# Patient Record
Sex: FEMALE | Race: OTHER | URBAN - METROPOLITAN AREA
[De-identification: names, ages, dates, MRNs, and addresses within clinical notes are randomized per-mention and may not be internally consistent; named-entity substitution may affect disease eponyms.]

---

## 2017-02-03 ENCOUNTER — EMERGENCY (EMERGENCY)
Facility: HOSPITAL | Age: 42
LOS: 0 days | Discharge: HOME | End: 2017-02-03

## 2017-06-27 DIAGNOSIS — R51 HEADACHE: ICD-10-CM

## 2017-06-27 DIAGNOSIS — Z87.891 PERSONAL HISTORY OF NICOTINE DEPENDENCE: ICD-10-CM

## 2017-06-27 DIAGNOSIS — Z90.49 ACQUIRED ABSENCE OF OTHER SPECIFIED PARTS OF DIGESTIVE TRACT: ICD-10-CM

## 2017-06-27 DIAGNOSIS — Z98.890 OTHER SPECIFIED POSTPROCEDURAL STATES: ICD-10-CM

## 2017-06-27 DIAGNOSIS — R20.0 ANESTHESIA OF SKIN: ICD-10-CM

## 2018-10-09 ENCOUNTER — INPATIENT (INPATIENT)
Facility: HOSPITAL | Age: 43
LOS: 1 days | Discharge: HOME | End: 2018-10-11
Attending: HOSPITALIST | Admitting: HOSPITALIST
Payer: MEDICAID

## 2018-10-09 VITALS
HEART RATE: 88 BPM | TEMPERATURE: 97 F | RESPIRATION RATE: 18 BRPM | SYSTOLIC BLOOD PRESSURE: 140 MMHG | DIASTOLIC BLOOD PRESSURE: 76 MMHG | OXYGEN SATURATION: 99 %

## 2018-10-09 DIAGNOSIS — Z90.49 ACQUIRED ABSENCE OF OTHER SPECIFIED PARTS OF DIGESTIVE TRACT: Chronic | ICD-10-CM

## 2018-10-09 DIAGNOSIS — Z98.891 HISTORY OF UTERINE SCAR FROM PREVIOUS SURGERY: Chronic | ICD-10-CM

## 2018-10-09 LAB
ALBUMIN SERPL ELPH-MCNC: 4.5 G/DL — SIGNIFICANT CHANGE UP (ref 3.5–5.2)
ALP SERPL-CCNC: 107 U/L — SIGNIFICANT CHANGE UP (ref 30–115)
ALT FLD-CCNC: 14 U/L — SIGNIFICANT CHANGE UP (ref 0–41)
ANION GAP SERPL CALC-SCNC: 15 MMOL/L — HIGH (ref 7–14)
APTT BLD: 36.4 SEC — SIGNIFICANT CHANGE UP (ref 27–39.2)
AST SERPL-CCNC: 27 U/L — SIGNIFICANT CHANGE UP (ref 0–41)
BASOPHILS # BLD AUTO: 0.03 K/UL — SIGNIFICANT CHANGE UP (ref 0–0.2)
BASOPHILS NFR BLD AUTO: 0.3 % — SIGNIFICANT CHANGE UP (ref 0–1)
BILIRUB SERPL-MCNC: 0.3 MG/DL — SIGNIFICANT CHANGE UP (ref 0.2–1.2)
BUN SERPL-MCNC: 9 MG/DL — LOW (ref 10–20)
CALCIUM SERPL-MCNC: 9.2 MG/DL — SIGNIFICANT CHANGE UP (ref 8.5–10.1)
CHLORIDE SERPL-SCNC: 102 MMOL/L — SIGNIFICANT CHANGE UP (ref 98–110)
CK MB CFR SERPL CALC: 1 NG/ML — SIGNIFICANT CHANGE UP (ref 0.6–6.3)
CK SERPL-CCNC: 123 U/L — SIGNIFICANT CHANGE UP (ref 0–225)
CO2 SERPL-SCNC: 21 MMOL/L — SIGNIFICANT CHANGE UP (ref 17–32)
CREAT SERPL-MCNC: 0.6 MG/DL — LOW (ref 0.7–1.5)
EOSINOPHIL # BLD AUTO: 0.1 K/UL — SIGNIFICANT CHANGE UP (ref 0–0.7)
EOSINOPHIL NFR BLD AUTO: 1 % — SIGNIFICANT CHANGE UP (ref 0–8)
GLUCOSE SERPL-MCNC: 103 MG/DL — HIGH (ref 70–99)
HCT VFR BLD CALC: 36 % — LOW (ref 37–47)
HGB BLD-MCNC: 11.8 G/DL — LOW (ref 12–16)
IMM GRANULOCYTES NFR BLD AUTO: 0.3 % — SIGNIFICANT CHANGE UP (ref 0.1–0.3)
INR BLD: 1.14 RATIO — SIGNIFICANT CHANGE UP (ref 0.65–1.3)
LYMPHOCYTES # BLD AUTO: 4.19 K/UL — HIGH (ref 1.2–3.4)
LYMPHOCYTES # BLD AUTO: 43.7 % — SIGNIFICANT CHANGE UP (ref 20.5–51.1)
MAGNESIUM SERPL-MCNC: 2 MG/DL — SIGNIFICANT CHANGE UP (ref 1.8–2.4)
MCHC RBC-ENTMCNC: 23.9 PG — LOW (ref 27–31)
MCHC RBC-ENTMCNC: 32.8 G/DL — SIGNIFICANT CHANGE UP (ref 32–37)
MCV RBC AUTO: 72.9 FL — LOW (ref 81–99)
MONOCYTES # BLD AUTO: 0.73 K/UL — HIGH (ref 0.1–0.6)
MONOCYTES NFR BLD AUTO: 7.6 % — SIGNIFICANT CHANGE UP (ref 1.7–9.3)
NEUTROPHILS # BLD AUTO: 4.5 K/UL — SIGNIFICANT CHANGE UP (ref 1.4–6.5)
NEUTROPHILS NFR BLD AUTO: 47.1 % — SIGNIFICANT CHANGE UP (ref 42.2–75.2)
PLATELET # BLD AUTO: 330 K/UL — SIGNIFICANT CHANGE UP (ref 130–400)
POTASSIUM SERPL-MCNC: 4.4 MMOL/L — SIGNIFICANT CHANGE UP (ref 3.5–5)
POTASSIUM SERPL-SCNC: 4.4 MMOL/L — SIGNIFICANT CHANGE UP (ref 3.5–5)
PROT SERPL-MCNC: 7.3 G/DL — SIGNIFICANT CHANGE UP (ref 6–8)
PROTHROM AB SERPL-ACNC: 12.3 SEC — SIGNIFICANT CHANGE UP (ref 9.95–12.87)
RBC # BLD: 4.94 M/UL — SIGNIFICANT CHANGE UP (ref 4.2–5.4)
RBC # FLD: 15.9 % — HIGH (ref 11.5–14.5)
SODIUM SERPL-SCNC: 138 MMOL/L — SIGNIFICANT CHANGE UP (ref 135–146)
TROPONIN T SERPL-MCNC: <0.01 NG/ML — SIGNIFICANT CHANGE UP
TYPE + AB SCN PNL BLD: SIGNIFICANT CHANGE UP
WBC # BLD: 9.58 K/UL — SIGNIFICANT CHANGE UP (ref 4.8–10.8)
WBC # FLD AUTO: 9.58 K/UL — SIGNIFICANT CHANGE UP (ref 4.8–10.8)

## 2018-10-09 RX ORDER — ASPIRIN/CALCIUM CARB/MAGNESIUM 324 MG
325 TABLET ORAL ONCE
Qty: 0 | Refills: 0 | Status: COMPLETED | OUTPATIENT
Start: 2018-10-09 | End: 2018-10-09

## 2018-10-09 RX ORDER — MIDAZOLAM HYDROCHLORIDE 1 MG/ML
2 INJECTION, SOLUTION INTRAMUSCULAR; INTRAVENOUS ONCE
Qty: 0 | Refills: 0 | Status: DISCONTINUED | OUTPATIENT
Start: 2018-10-09 | End: 2018-10-09

## 2018-10-09 RX ORDER — ENOXAPARIN SODIUM 100 MG/ML
40 INJECTION SUBCUTANEOUS DAILY
Qty: 0 | Refills: 0 | Status: DISCONTINUED | OUTPATIENT
Start: 2018-10-09 | End: 2018-10-11

## 2018-10-09 RX ORDER — ATORVASTATIN CALCIUM 80 MG/1
80 TABLET, FILM COATED ORAL AT BEDTIME
Qty: 0 | Refills: 0 | Status: DISCONTINUED | OUTPATIENT
Start: 2018-10-09 | End: 2018-10-11

## 2018-10-09 RX ADMIN — MIDAZOLAM HYDROCHLORIDE 2 MILLIGRAM(S): 1 INJECTION, SOLUTION INTRAMUSCULAR; INTRAVENOUS at 13:45

## 2018-10-09 RX ADMIN — Medication 325 MILLIGRAM(S): at 14:51

## 2018-10-09 RX ADMIN — ATORVASTATIN CALCIUM 80 MILLIGRAM(S): 80 TABLET, FILM COATED ORAL at 21:36

## 2018-10-09 NOTE — PATIENT PROFILE ADULT - NSPROMUTANXFEARFT_GEN_A_NUR
Left message on unidentified voice mail asking patient to return call to clinic.  Plan pending patient discussion: Maintain TWD  16 mg - 3 mg on Tu, Th; 2 mg all other days.    Patient was instructed to continue taking their Coumadin as follows:    Anticoagulation Summary  As of 5/24/2018    INR goal:   2.0-3.0   TTR:   62.1 % (2.3 y)   Today's INR:   2.4   Warfarin maintenance plan:   3 mg (2 mg x 1.5) on Tu, Th; 2 mg (2 mg x 1) all other days   Weekly warfarin total:   16 mg   No change documented:   Rebeca Gonzales RN   Plan last modified:   Rebeca Gonzales RN (4/6/2018)   Next INR check:   6/21/2018   Priority:   Follow-Up - 4 Weeks   Target end date:   Indefinite    Indications    Persistent atrial fibrillation (CMS/HCC) [I48.1]  Left ventricular apical thrombus [[id number]]             Anticoagulation Episode Summary     INR check location:       Preferred lab:       Send INR reminders to:   [facility]    Comments:   Please call on her cell or work cell [phone] work [phone] [facility]  [phone] ansd phone number # [phone] [facility] med pharmacy [phone].      Anticoagulation Care Providers     Provider Role Specialty Phone number    Neo Hewitt MD Referring Cardiovascular Disease [phone]         Patient was instructed to contact us with any unusual bleeding,bruising,any changes in medication, antibiotic use,diet or health status.  Patient was instructed to call with any questions or concerns.      nervous about having these symptoms

## 2018-10-09 NOTE — H&P ADULT - ATTENDING COMMENTS
Patient is seen and examined independently at bedside. I agree with the resident's note, history, physical exam and plan as above.    All labs, radiologic studies, vitals, orders and medications list reviewed.

## 2018-10-09 NOTE — H&P ADULT - REASON FOR ADMISSION
Left sided facial droop, weakness and hemisensory deficit Left sided facial, upper and lower extremity numbness and hemisensory deficit

## 2018-10-09 NOTE — ED PROVIDER NOTE - OBJECTIVE STATEMENT
43 year old female with a pmh of paniac attacks presents here c/o left sided weakness that began 43 year old female with a pmh of paniac attacks presents here c/o left sided weakness that began this morning at 7am when patient woke up. Patient also c/o headache & left sided facial twitching. Patient has never had these symptoms in the past and denies fever chills blurry vision, vomitting cp sob abdominal pain, urinary frequency/urgency/burning. LMP 2 weeks ago.

## 2018-10-09 NOTE — ED PROVIDER NOTE - ATTENDING CONTRIBUTION TO CARE
43 year old female PMH panic attacks presents here c/o left sided weakness that began 2 hours ago.  Patient also has left face twitching.  This never happened before.  Stroke called called in triage.      Appropriate labs sent, patient had CT scan of brain followed by CTA/Perfusion.  Patient has persistent twitching of face.  Will give trial dose of a benzo (Versed) for possible partial seizure.  Patient evaluated by Neurology.  She is not a t-PA candidate.     CTA performed to evaluate for an LVO.  Baseline MRS score 0. 43 year old female PMH panic attacks presents here c/o left sided weakness that began 2 hours ago.  Patient also has left face twitching.  This never happened before.  Stroke called called in triage.      Appropriate labs sent, patient had CT scan of brain followed by CTA/Perfusion.  Patient has persistent twitching of face.  Will give trial dose of a benzo (Versed) for possible partial seizure.  Patient evaluated by Neurology.  She is not a t-PA candidate.     CTA performed to evaluate for an LVO.  no LVO found. Baseline MRS score 0.  ED work up reviewed.  Will admit to Stroke Unit.

## 2018-10-09 NOTE — H&P ADULT - NSHPSOCIALHISTORY_GEN_ALL_CORE
None smoker  No alcohol use  No illicit drugs    Lives with daughter  Active in ADLs  Works at a [employer] in [place]

## 2018-10-09 NOTE — ED PROVIDER NOTE - CRITICAL CARE PROVIDED
consult w/ pt's family directly relating to pts condition/interpretation of diagnostic studies/consultation with other physicians/additional history taking/direct patient care (not related to procedure)/documentation

## 2018-10-09 NOTE — ED PROVIDER NOTE - MEDICAL DECISION MAKING DETAILS
She was found to have a left upper and lower extremity drift as well as left upper and lower extremity weakness. NIHSS on arrival 3. CTH was negative for acute intracranial pathology. CTA head and neck CTP was negative for LVO. She was outside the therapeutic window for IV TPA. No acute stroke intervention. Left facial twitching may be related to partial seizure and IV Versed given and this completely stopped.  Will admit for EEG and stroke work up.

## 2018-10-09 NOTE — PROGRESS NOTE ADULT - SUBJECTIVE AND OBJECTIVE BOX
AYUSH JACKIE    Chief Complaint: Left sided weakness and numbness.    Right Handed    HPI:  43 year old right handed woman with a past medical history of panic attacks. She woke up with left upper and lower extremity numbness. Upon arrival to ED stroke code called. She was found to have a left upper and lower extremity drift as well as left upper and lower extremity weakness. NIHSS on arrival 3. CTH was negative for acute intracranial pathology. CTA heada nd neck CTP was negative for LVO. She was outside the therapeutic window for IV TPA. No acute stroke intervention. She has inconsistent left facial twitching, drift of left upper and lower extremity is also inconsistent and stuttering.      Relevant PMH:  [] Prior ischemic stroke/TIA  [] Afib  []CAD  []HTN  []DLD  []DM []PVD []Obesity [] Sedintary lifestyle []CHF  []JULY[]Cancer Hx     Social History: [] Smoking []  Drug Use: []   Alcohol Use:   [] Other:      Possible Location of Stroke:  Unknown we will have a better understanding post stroke workup.  Possible Cause of Stroke:  Unknown we will have a better understanding post stroke workup.  Relevant Cerebral Imaging:  CTH negative for acute intracranial pathology.  Relevant Cervicocerebral Imaging:   CTA head and neck CTP negative for LVO    CT Perfusion w/ Maps w/ IV Cont:   EXAM:  CT PERFUSION W MAPS IC        IMPRESSION:    No evidence of major vascular stenosis or occlusion. Normal perfusion   images.    Relevant blood tests:  LDL A1C pending    Relevant cardiac rhythm monitoring:  Telemetry pending  Relevant Cardiac Structure:(TTE/RAFAEL +/-):[]No intracardiac thrombus/[] no vegetation/[]no akynesia/EF:       TTE pending      Home Medications:      MEDICATIONS  (STANDING):      PT/OT/Speech/Rehab/S&Swr: Pending    Exam:    Vital Signs Last 24 Hrs  T(C): 36.1 (09 Oct 2018 12:01), Max: 36.1 (09 Oct 2018 12:01)  T(F): 97 (09 Oct 2018 12:01), Max: 97 (09 Oct 2018 12:01)  HR: 88 (09 Oct 2018 12:01) (88 - 88)  BP: 140/76 (09 Oct 2018 12:01) (140/76 - 140/76)  BP(mean): --  RR: 18 (09 Oct 2018 12:01) (18 - 18)  SpO2: 99% (09 Oct 2018 12:01) (99% - 99%)    NIHSS      LOC:       1a:  0   1b(Questions):    0       1c(Instructions):    0         Best Gaze:0  Visual:0  Motor:                 RUE:  0   RLE:  0   LUE:  1   LLE: 1    FACE:   0  Limb Ataxia:0  Sensory:     1  Language:   0    Dysarthria:   0       Extinction and Inattention:0    NIHSS on admission:    3       m-RS:1    Impression:  43 year old right handed woman with a past medical history of panic attacks. She woke up with left upper and lower extremity numbness. Upon arrival to ED stroke code called. She was found to have a left upper and lower extremity drift as well as left upper and lower extremity weakness. NIHSS on arrival 3. CTH was negative for acute intracranial pathology. CTA head and neck CTP was negative for LVO. She was outside the therapeutic window for IV TPA. No acute stroke intervention. She has inconsistent left facial twitching.      Suggestion:  Routine stroke workup including:  MRI brain without garrick.  TTE with contrast to rule out right to left shunt, akinesia, vegetation, intracardiac thrombus.  Telemetry monitoring.  LDL/A1C  PT OT Rehab     mg PO daily.  Would start statin.  Stat EEG.  Seizure precautions.  Keep magnesium >2.  Disposition:  Stroke Unit.    Luis Reveles NP  [phone] AYUSH JACKIE    Chief Complaint: Left sided weakness and numbness.    Right Handed    HPI:  43 year old right handed woman with a past medical history of panic attacks. She woke up with left upper and lower extremity numbness. Upon arrival to ED stroke code called. She was found to have a left upper and lower extremity drift as well as left upper and lower extremity weakness. NIHSS on arrival 3. CTH was negative for acute intracranial pathology. CTA heada nd neck CTP was negative for LVO. She was outside the therapeutic window for IV TPA. No acute stroke intervention. She has inconsistent left facial twitching, drift of left upper and lower extremity is also inconsistent and stuttering.      Relevant PMH:  [] Prior ischemic stroke/TIA  [] Afib  []CAD  []HTN  []DLD  []DM []PVD []Obesity [] Sedintary lifestyle []CHF  []JULY[]Cancer Hx     Social History: [] Smoking []  Drug Use: []   Alcohol Use:   [] Other:      Possible Location of Stroke:  Unknown we will have a better understanding post stroke workup.  Possible Cause of Stroke:  Unknown we will have a better understanding post stroke workup.  Relevant Cerebral Imaging:  CTH negative for acute intracranial pathology.  Relevant Cervicocerebral Imaging:   CTA head and neck CTP negative for LVO    CT Perfusion w/ Maps w/ IV Cont:   EXAM:  CT PERFUSION W MAPS IC        IMPRESSION:    No evidence of major vascular stenosis or occlusion. Normal perfusion   images.    Relevant blood tests:  LDL A1C pending    Relevant cardiac rhythm monitoring:  Telemetry pending  Relevant Cardiac Structure:(TTE/RAFAEL +/-):[]No intracardiac thrombus/[] no vegetation/[]no akynesia/EF:       TTE pending      Home Medications:      MEDICATIONS  (STANDING):      PT/OT/Speech/Rehab/S&Swr: Pending    Exam:    Vital Signs Last 24 Hrs  T(C): 36.1 (09 Oct 2018 12:01), Max: 36.1 (09 Oct 2018 12:01)  T(F): 97 (09 Oct 2018 12:01), Max: 97 (09 Oct 2018 12:01)  HR: 88 (09 Oct 2018 12:01) (88 - 88)  BP: 140/76 (09 Oct 2018 12:01) (140/76 - 140/76)  BP(mean): --  RR: 18 (09 Oct 2018 12:01) (18 - 18)  SpO2: 99% (09 Oct 2018 12:01) (99% - 99%)    NIHSS      LOC:       1a:  0   1b(Questions):    0       1c(Instructions):    0         Best Gaze:0  Visual:0  Motor:                 RUE:  0   RLE:  0   LUE:  1   LLE: 1    FACE:   0  Limb Ataxia:0  Sensory:     1  Language:   0    Dysarthria:   0       Extinction and Inattention:0    NIHSS on admission:    3       m-RS:1    Impression:  43 year old right handed woman with a past medical history of panic attacks. She woke up with left upper and lower extremity numbness. Upon arrival to ED stroke code called. She was found to have a left upper and lower extremity drift as well as left upper and lower extremity weakness. NIHSS on arrival 3. CTH was negative for acute intracranial pathology. CTA head and neck CTP was negative for LVO. She was outside the therapeutic window for IV TPA. No acute stroke intervention. She has inconsistent left facial twitching.      Suggestion:  Routine stroke workup including:  MRI brain without garrick.  TTE with contrast to rule out right to left shunt, akinesia, vegetation, intracardiac thrombus.  Telemetry monitoring.  LDL/A1C  PT OT Rehab     mg PO daily.  Would start statin.  Stat EEG.  Seizure precautions.  Keep magnesium >2.  Neurochecks Q4 hours and vitals q4 hours  Disposition:  Stroke Unit.    Luis Reveles NP  [phone]

## 2018-10-09 NOTE — H&P ADULT - FAMILY HISTORY
Father  Still living? Yes, Estimated age: 77  Family history of stroke, Age at diagnosis: Age Unknown  Family history of MI (myocardial infarction), Age at diagnosis: Age Unknown  Family history of hypertension, Age at diagnosis: Age Unknown     Mother  Still living? Yes, Estimated age: 75  Family history of osteoporosis, Age at diagnosis: Age Unknown  Family history of osteoarthritis, Age at diagnosis: Age Unknown     Sibling  Still living? Yes, Estimated age: 50  Family history of hypertension, Age at diagnosis: Age Unknown

## 2018-10-09 NOTE — ED PROVIDER NOTE - NS ED ROS FT
Constitutional: See HPI.  Eyes: No visual changes  ENMT: No neck pain or stiffness.   Cardiac: No cp  Respiratory: No cough or respiratory distress.   GI: +nausea no vomiting, diarrhea or abdominal pain.  : No dysuria, frequency or burning.   MS: No myalgia, muscle weakness, joint pain or back pain.  Neuro: see hpi  Skin: No skin rash.

## 2018-10-09 NOTE — ED ADULT NURSE NOTE - NSIMPLEMENTINTERV_GEN_ALL_ED
Implemented All Universal Safety Interventions:  [place] to call system. Call bell, personal items and telephone within reach. Instruct patient to call for assistance. Room bathroom lighting operational. Non-slip footwear when patient is off stretcher. Physically safe environment: no spills, clutter or unnecessary equipment. Stretcher in lowest position, wheels locked, appropriate side rails in place.

## 2018-10-09 NOTE — H&P ADULT - NSHPRISKHIVSCREEN_GEN_ALL_CORE
DISCHARGE PLANNING     Discharge to home or other facility with appropriate resources Progressing        INFECTION - ADULT     Absence or prevention of progression during hospitalization Progressing        Knowledge Deficit     Patient/family/caregiver demonstrates understanding of disease process, treatment plan, medications, and discharge instructions Progressing        PAIN - ADULT     Verbalizes/displays adequate comfort level or baseline comfort level Progressing        POSTPARTUM     Experiences normal postpartum course Progressing     Appropriate maternal -  bonding Progressing     Establishment of infant feeding pattern Progressing     Incision(s), wounds(s) or drain site(s) healing without S/S of infection Progressing        SAFETY ADULT     Patient will remain free of falls Progressing     Maintain or return to baseline ADL function Progressing     Maintain or return mobility status to optimal level Progressing Offered and patient declined

## 2018-10-09 NOTE — H&P ADULT - ASSESSMENT
43 year old right handed female with a past medical history of Panic Attacks and Miscarriage at the age of 17 presenting with left sided facial, upper and lower extremity numbness after waking up.    Left sided numbness and weakness associated with left temporal headache, left blurry vision and left mastoid tenderness/jaw claudication; Right sided cerebral ischemia vs Complicated Migraine vs Giant Cell Arteritis  - s/p CTP with no evidence of focal perfusion deficit to suggest acute cerebral ischemia or large territory infarct.  - In the setting of temporal headache and left blurry vision with a history of miscarriage, will send ESR, CRP and all hypercoagulable panel. Pending Neurointerventional's decision if steroids need to be initiated.  - ASA 162mg, Atorvastatin 80mg  - Lipid profile, HbA1c, TSH, Mg, ESR, CRP, Hypercoagulable workup pending.  - TTE with contrast pending. MRI without garrick pending. EEG pending.  - Stroke unit/Telemetry  - Seizure precautions, Fall precautions, Aspiration precautions  - Physical therapy, Occupational therapy, Physical Medicine rehab assessment, Speech and swallow evaluation    Microcytic Anemia  - Asymptomatic  - Iron studies  - Monitor CBC    DVT prophylaxis; Lovenox  NPO for now until Speech and swallow assessment  Activity; ambulate only with assistance 43 year old right handed female with a past medical history of Panic Attacks and Miscarriage at the age of 17 presenting with left sided facial, upper and lower extremity numbness after waking up.    Left sided numbness and weakness associated with left temporal headache, left blurry vision and left mastoid tenderness/jaw claudication; Right sided cerebral ischemia vs Complicated Migraine vs Giant Cell Arteritis  - s/p CTP with no evidence of focal perfusion deficit to suggest acute cerebral ischemia or large territory infarct.  - In the setting of temporal headache and left blurry vision with a history of miscarriage, will send ESR, CRP and all hypercoagulable panel. Pending Neurointerventional's decision if steroids need to be initiated.  - ASA 162mg, Atorvastatin 80mg  - Lipid profile, HbA1c, TSH, Mg, ESR, CRP, Hypercoagulable workup pending.  - TTE with bubble study pending. MRI without garrick pending. EEG pending.  - Stroke unit/Telemetry  - Seizure precautions, Fall precautions, Aspiration precautions  - Physical therapy, Occupational therapy, Physical Medicine rehab assessment, Speech and swallow evaluation    Microcytic Anemia  - Asymptomatic  - Iron studies  - Monitor CBC    DVT prophylaxis; Lovenox  NPO for now until Speech and swallow assessment  Activity; ambulate only with assistance

## 2018-10-09 NOTE — ED PROVIDER NOTE - PHYSICAL EXAMINATION
CONSTITUTIONAL: WA / WN / NAD  HEAD: NCAT + left sided facial droop & twitching  EYES: PERRL; EOMI;   ENT: Normal pharynx; mucous membranes pink/moist, no erythema.  NECK: Supple;   CARD: RRR; nl S1/S2; no M/R/G. Pulses equal bilaterally.  RESP: Respiratory rate and effort are normal; breath sounds clear and equal bilaterally.  ABD: Soft, NT ND   MSK/EXT: No gross deformities;   SKIN: Warm and dry;   NEURO: AAOx3, Motor 5/5 upper and lower right extremity. 4/5 left upper and lower extremity. Decreased sensation on the left. Left pronator drift. Able to ambulate   PSYCH: Memory Intact, Normal Affect

## 2018-10-09 NOTE — H&P ADULT - HISTORY OF PRESENT ILLNESS
43 year old right handed female 43 year old right handed female with a past medical history of Panic Attacks presenting with left sided facial, upper and lower extremity numbness after waking up. 43 year old right handed female with a past medical history of Panic Attacks presenting with left sided facial, upper and lower extremity numbness after waking up. Patient reports that she has been having a headache for the past 3 days, was working when it started, constant, localized to the frontal area, described as a pressure/tension, relieved mildly by Excedrin, exacerbated by anxiety, not associated with any factors. Patient reports that today she woke up with left upper lip swelling and left sided facial numbness below the eye to the mandibular level, as well as left sided upper extremity and lower extremity numbness. Patient reports that she was experiencing a headache at the onset of symptoms when she woke up, similar to the one that she has been having for the past 3 days.  Patient reports blurry vision in both eyes, described as white drops in her eyes, not associated with pain.  Patient also reports imbalance when she walks, leaning more towards the left side, not associated with a fall. Patient also reports nausea that started this morning, not associated with vomiting, alleviated on its own. Patient denies lightheadedness, dizziness, slurry speech, urinary or fecal incontinence. Patient denies chest pain, palpitations, shortness of breath, abdominal pain, lower extremity swelling/warmth/tenderness. 43 year old right handed female with a past medical history of Panic Attacks and Miscarriage at the age of 17 presenting with left sided facial, upper and lower extremity numbness after waking up. Patient reports that she has been having a headache for the past 3 days, was working when it started, constant, localized to the frontal area, described as a pressure/tension, relieved mildly by Excedrin, exacerbated by anxiety, not associated with any factors. Patient reports that today she woke up with left upper lip swelling and left sided facial numbness below the eye to the mandibular level, as well as left sided upper extremity and lower extremity numbness. Patient reports that she was experiencing a headache at the onset of symptoms when she woke up, similar to the one that she has been having for the past 3 days.  Patient reports blurry vision in both eyes, described as white drops in her eyes, not associated with pain.  Patient also reports imbalance when she walks, leaning more towards the left side, not associated with a fall. Patient also reports nausea that started this morning, not associated with vomiting, alleviated on its own. Patient endorses jaw clenching, and jaw claudication when she opens her mouth that is associated with left sided tenderness behind her ear. Patient denies lightheadedness, dizziness, slurry speech, urinary or fecal incontinence. Patient denies chest pain, palpitations, shortness of breath, abdominal pain, lower extremity swelling/warmth/tenderness.  No OCP use, no herbal medications, no recent travels, no history of strokes in the family at a young age (father at the age of 70).

## 2018-10-09 NOTE — H&P ADULT - NSHPPHYSICALEXAM_GEN_ALL_CORE
Vital Signs Last 24 Hrs  T(C): 36.1 (09 Oct 2018 12:01), Max: 36.1 (09 Oct 2018 12:01)  T(F): 97 (09 Oct 2018 12:01), Max: 97 (09 Oct 2018 12:01)  HR: 88 (09 Oct 2018 12:01) (88 - 88)  BP: 140/76 (09 Oct 2018 12:01) (140/76 - 140/76)  BP(mean): --  RR: 18 (09 Oct 2018 12:01) (18 - 18)  SpO2: 99% (09 Oct 2018 12:01) (99% - 99%)    Constitutional:  HEENT:  Cardiac:  Lungs:  Abdomen:  Extremities:  Skin:  Psych:  Neuro: NIHSS Vital Signs Last 24 Hrs  T(C): 36.1 (09 Oct 2018 12:01), Max: 36.1 (09 Oct 2018 12:01)  T(F): 97 (09 Oct 2018 12:01), Max: 97 (09 Oct 2018 12:01)  HR: 88 (09 Oct 2018 12:01) (88 - 88)  BP: 140/76 (09 Oct 2018 12:01) (140/76 - 140/76)  BP(mean): --  RR: 18 (09 Oct 2018 12:01) (18 - 18)  SpO2: 99% (09 Oct 2018 12:01) (99% - 99%)    Constitutional:  HEENT:  Cardiac:  Lungs:  Abdomen:  Extremities:  Skin:  Psych:  Neuro: NIHSS 3 (No loss of consciousness; no visual deficits; LUE motor deficit, LLE motor deficit, no facial deficits, no ataxia, left sided hemisensory deficit, no language deficits, no dysarthria, attentive) Vital Signs Last 24 Hrs  T(C): 36.1 (09 Oct 2018 12:01), Max: 36.1 (09 Oct 2018 12:01)  T(F): 97 (09 Oct 2018 12:01), Max: 97 (09 Oct 2018 12:01)  HR: 88 (09 Oct 2018 12:01) (88 - 88)  BP: 140/76 (09 Oct 2018 12:01) (140/76 - 140/76)  BP(mean): --  RR: 18 (09 Oct 2018 12:01) (18 - 18)  SpO2: 99% (09 Oct 2018 12:01) (99% - 99%)    Constitutional: lying comfortably in bed, not in acute distress, speaking in coherent phrases and understands conversation  HEENT:  Cardiac:  Lungs:  Abdomen:  Extremities:  Skin:  Psych:  Neuro: NIHSS 3 (No loss of consciousness; no visual deficits; LUE motor deficit, LLE motor deficit, no facial deficits, no ataxia, left sided hemisensory deficit, no language deficits, no dysarthria, attentive) Vital Signs Last 24 Hrs  T(C): 36.1 (09 Oct 2018 12:01), Max: 36.1 (09 Oct 2018 12:01)  T(F): 97 (09 Oct 2018 12:01), Max: 97 (09 Oct 2018 12:01)  HR: 88 (09 Oct 2018 12:01) (88 - 88)  BP: 140/76 (09 Oct 2018 12:01) (140/76 - 140/76)  BP(mean): --  RR: 18 (09 Oct 2018 12:01) (18 - 18)  SpO2: 99% (09 Oct 2018 12:01) (99% - 99%)    Constitutional: lying comfortably in bed, not in acute distress, speaking in coherent phrases and understands conversation  HEENT: NCAT, left temporal tenderness, left mastoid process tenderness to palpation, left palpebral droop, EOMI, no nystagmus, no gaze deviation, PERRLA, neck supple, trachea midline, nonerythematous oropharynx, nonexudative tonsils, no uvular deviation  Cardiac: S1 S2, RRR, no audible murmurs  Lungs: CTAB, GBAE, no crackles, no wheezes  Abdomen: +BS, soft, NTND, no guarding, no rebound tenderness  Extremities: +2 DP b/l, -ve LLE b/l  Skin: No rashes, no ecchymosis  Psych: Normal affect  Neuro: NIHSS 3 (No loss of consciousness; no visual deficits; LUE motor deficit, LLE motor deficit, no facial asymmetry, no ataxia, left sided hemisensory deficit, no language deficits, no dysarthria, attentive)  - CN II intact, CN III intact, CN IV & IV intact, CN V sensory deficit, CN VII intact (no masseter deficit)/facial symmetry, CN VIII auditory intact but unable to assess equilibrium, CN IX intact, CN X intact, CN XI intact, CN XII intact  - RUE & RLE 5/5. LUE & LLE 4/5  - Left hemisensory deficits from below CN V1 to lower extremity  - DTRs +2 b/l patellar and brachial  - Left sided pronator drift  - Finger to nose limited by patient's left sided weakness  - Unable to assess for ataxia Vital Signs Last 24 Hrs  T(C): 36.1 (09 Oct 2018 12:01), Max: 36.1 (09 Oct 2018 12:01)  T(F): 97 (09 Oct 2018 12:01), Max: 97 (09 Oct 2018 12:01)  HR: 88 (09 Oct 2018 12:01) (88 - 88)  BP: 140/76 (09 Oct 2018 12:01) (140/76 - 140/76)  BP(mean): --  RR: 18 (09 Oct 2018 12:01) (18 - 18)  SpO2: 99% (09 Oct 2018 12:01) (99% - 99%)    Constitutional: lying comfortably in bed, not in acute distress, speaking in coherent phrases and understands conversation  Eyes: EOMI, PERRLA, conjunctiva clear, mild right eye protrusion   HENMT: NCAT, left temporal tenderness, left mastoid process tenderness to palpation, left palpebral droop, EOMI, no nystagmus, no gaze deviation, PERRLA, neck supple, trachea midline, nonerythematous oropharynx, non-exudative tonsils, no uvular deviation  Cardiac: S1 S2, RRR, no audible murmurs  Respiratory: CTAB, GBAE, no crackles, no wheezes  Gastrointestinal: +BS, soft, NTND, no guarding, no rebound tenderness  Extremities: +2 DP b/l, -ve CCE b/l  Skin: No rashes, no ecchymosis  Psych: Normal affect, AAO x 3  Lymph: No cervical lymphadenopathy  Musculoskeletal: No joint swelling or tenderness.  Neuro: NIHSS 3 (No loss of consciousness; no visual deficits; LUE motor deficit, LLE motor deficit, no facial asymmetry, no ataxia, left sided hemisensory deficit, no language deficits, no dysarthria, attentive)  - CN II intact, CN III intact, CN IV & IV intact, CN V sensory deficit, CN VII intact (no masseter deficit)/facial symmetry, CN VIII auditory intact but unable to assess equilibrium, CN IX intact, CN X intact, CN XI intact, CN XII intact  - RUE & RLE 5/5. LUE & LLE 4/5  - Left hemisensory deficits from below CN V1 to lower extremity  - DTRs +2 b/l patellar and brachial  - Left sided pronator drift  - Finger to nose limited by patient's left sided weakness  - Unable to assess for ataxia

## 2018-10-09 NOTE — H&P ADULT - NSHPLABSRESULTS_GEN_ALL_CORE
Labs    CBC Full  -  ( 09 Oct 2018 12:17 )  WBC Count : 9.58 K/uL  Hemoglobin : 11.8 g/dL  Hematocrit : 36.0 %  Platelet Count - Automated : 330 K/uL  Mean Cell Volume : 72.9 fL  Mean Cell Hemoglobin : 23.9 pg  Mean Cell Hemoglobin Concentration : 32.8 g/dL  Auto Neutrophil # : 4.50 K/uL  Auto Lymphocyte # : 4.19 K/uL  Auto Monocyte # : 0.73 K/uL  Auto Eosinophil # : 0.10 K/uL  Auto Basophil # : 0.03 K/uL  Auto Neutrophil % : 47.1 %  Auto Lymphocyte % : 43.7 %  Auto Monocyte % : 7.6 %  Auto Eosinophil % : 1.0 %  Auto Basophil % : 0.3 %      10-09    138  |  102  |  9<L>  ----------------------------<  103<H>  4.4   |  21  |  0.6<L>    Ca    9.2      09 Oct 2018 12:17  TPro  7.3  /  Alb  4.5  /  TBili  0.3  /  DBili  x   /  AST  27  /  ALT  14  /  AlkPhos  107  10-09  PT/INR - ( 09 Oct 2018 12:17 )   PT: 12.30 sec;   INR: 1.14 ratio    PTT - ( 09 Oct 2018 12:17 )  PTT:36.4 sec    CARDIAC MARKERS ( 09 Oct 2018 12:17 )  Trop <0.01 ng/mL /  U/L / CKMB x    Type + Screen (10.09.18 @ 12:32)    Type + Screen: A POS    EKG: NSR      Radio  < from: Xray Chest 1 View AP/PA (10.09.18 @ 13:36) >  No radiographic evidence of acute cardiopulmonary disease. < end of copied text >    < from: CT Brain Stroke Protocol (10.09.18 @ 12:39) >  No CT evidence of acute intracranial hemorrhage or large territory   infarct. < end of copied text >    < from: CT Perfusion w/ Maps w/ IV Cont (10.09.18 @ 12:55) >  Perfusion:  There is no evidence of focal perfusion deficit to suggest acute cerebral ischemia.  CTA neck: The visualized aortic arch and great vessel origins are patent.  The innominate and subclavian arteries are patent.  The right common, internal and external carotid arteries are patent.  The left common, internal and external carotid arteries are patent.  The vertebral arteries are patent bilaterally.  CTA brain: The distal segments of the internal carotid arteries are patent.  There is normal bifurcation into the A1 and M1 segments.  The anterior and middle cerebral arteries are patent.  The distal vertebral arteries are patent. The posterior inferior cerebellar arteries are patent. The basilar artery is patent with normal bifurcation into the P1 segments.  < end of copied text >

## 2018-10-10 LAB
ALBUMIN SERPL ELPH-MCNC: 3.9 G/DL — SIGNIFICANT CHANGE UP (ref 3.5–5.2)
ALP SERPL-CCNC: 95 U/L — SIGNIFICANT CHANGE UP (ref 30–115)
ALT FLD-CCNC: 11 U/L — SIGNIFICANT CHANGE UP (ref 0–41)
ANION GAP SERPL CALC-SCNC: 14 MMOL/L — SIGNIFICANT CHANGE UP (ref 7–14)
AST SERPL-CCNC: 14 U/L — SIGNIFICANT CHANGE UP (ref 0–41)
AT III ACT/NOR PPP CHRO: 101 % — SIGNIFICANT CHANGE UP (ref 85–135)
BILIRUB SERPL-MCNC: 0.4 MG/DL — SIGNIFICANT CHANGE UP (ref 0.2–1.2)
BUN SERPL-MCNC: 8 MG/DL — LOW (ref 10–20)
CALCIUM SERPL-MCNC: 8.7 MG/DL — SIGNIFICANT CHANGE UP (ref 8.5–10.1)
CHLORIDE SERPL-SCNC: 104 MMOL/L — SIGNIFICANT CHANGE UP (ref 98–110)
CHOLEST SERPL-MCNC: 168 MG/DL — SIGNIFICANT CHANGE UP (ref 100–200)
CO2 SERPL-SCNC: 22 MMOL/L — SIGNIFICANT CHANGE UP (ref 17–32)
CREAT SERPL-MCNC: 0.6 MG/DL — LOW (ref 0.7–1.5)
ERYTHROCYTE [SEDIMENTATION RATE] IN BLOOD: 26 MM/HR — HIGH (ref 0–15)
ESTIMATED AVERAGE GLUCOSE: 100 MG/DL — SIGNIFICANT CHANGE UP (ref 68–114)
GLUCOSE BLDC GLUCOMTR-MCNC: 111 MG/DL — HIGH (ref 70–99)
GLUCOSE SERPL-MCNC: 88 MG/DL — SIGNIFICANT CHANGE UP (ref 70–99)
HBA1C BLD-MCNC: 5.1 % — SIGNIFICANT CHANGE UP (ref 4–5.6)
HCT VFR BLD CALC: 31.5 % — LOW (ref 37–47)
HCYS SERPL-MCNC: 6.9 UMOL/L — SIGNIFICANT CHANGE UP (ref 5–15)
HDLC SERPL-MCNC: 41 MG/DL — LOW
HGB BLD-MCNC: 10.1 G/DL — LOW (ref 12–16)
IRON SATN MFR SERPL: 17 % — SIGNIFICANT CHANGE UP (ref 15–50)
IRON SATN MFR SERPL: 67 UG/DL — SIGNIFICANT CHANGE UP (ref 35–150)
IRON SATN MFR SERPL: 70 UG/DL — SIGNIFICANT CHANGE UP (ref 35–150)
LIPID PNL WITH DIRECT LDL SERPL: 123 MG/DL — SIGNIFICANT CHANGE UP (ref 4–129)
MAGNESIUM SERPL-MCNC: 1.9 MG/DL — SIGNIFICANT CHANGE UP (ref 1.8–2.4)
MCHC RBC-ENTMCNC: 23.5 PG — LOW (ref 27–31)
MCHC RBC-ENTMCNC: 32.1 G/DL — SIGNIFICANT CHANGE UP (ref 32–37)
MCV RBC AUTO: 73.3 FL — LOW (ref 81–99)
NRBC # BLD: 0 /100 WBCS — SIGNIFICANT CHANGE UP (ref 0–0)
PLATELET # BLD AUTO: 280 K/UL — SIGNIFICANT CHANGE UP (ref 130–400)
POTASSIUM SERPL-MCNC: 3.9 MMOL/L — SIGNIFICANT CHANGE UP (ref 3.5–5)
POTASSIUM SERPL-SCNC: 3.9 MMOL/L — SIGNIFICANT CHANGE UP (ref 3.5–5)
PROT SERPL-MCNC: 6.4 G/DL — SIGNIFICANT CHANGE UP (ref 6–8)
RBC # BLD: 4.3 M/UL — SIGNIFICANT CHANGE UP (ref 4.2–5.4)
RBC # FLD: 15.9 % — HIGH (ref 11.5–14.5)
SODIUM SERPL-SCNC: 140 MMOL/L — SIGNIFICANT CHANGE UP (ref 135–146)
TIBC SERPL-MCNC: 383 UG/DL — SIGNIFICANT CHANGE UP (ref 220–430)
TOTAL CHOLESTEROL/HDL RATIO MEASUREMENT: 4.1 RATIO — SIGNIFICANT CHANGE UP (ref 4–5.5)
TRIGL SERPL-MCNC: 95 MG/DL — SIGNIFICANT CHANGE UP (ref 10–149)
UIBC SERPL-MCNC: 316 UG/DL — SIGNIFICANT CHANGE UP (ref 110–370)
WBC # BLD: 6.07 K/UL — SIGNIFICANT CHANGE UP (ref 4.8–10.8)
WBC # FLD AUTO: 6.07 K/UL — SIGNIFICANT CHANGE UP (ref 4.8–10.8)

## 2018-10-10 PROCEDURE — 93970 EXTREMITY STUDY: CPT | Mod: 26

## 2018-10-10 RX ORDER — ASPIRIN/CALCIUM CARB/MAGNESIUM 324 MG
162 TABLET ORAL DAILY
Qty: 0 | Refills: 0 | Status: DISCONTINUED | OUTPATIENT
Start: 2018-10-10 | End: 2018-10-11

## 2018-10-10 RX ADMIN — ENOXAPARIN SODIUM 40 MILLIGRAM(S): 100 INJECTION SUBCUTANEOUS at 12:25

## 2018-10-10 RX ADMIN — Medication 162 MILLIGRAM(S): at 11:12

## 2018-10-10 RX ADMIN — ATORVASTATIN CALCIUM 80 MILLIGRAM(S): 80 TABLET, FILM COATED ORAL at 22:17

## 2018-10-10 NOTE — PROGRESS NOTE ADULT - SUBJECTIVE AND OBJECTIVE BOX
AYUSH JACKIE    Chief Complaint: Left sided weakness and numbness.    Right Handed    HPI:  43 year old right handed woman with a past medical history of panic attacks. She woke up with left upper and lower extremity numbness. Upon arrival to ED stroke code called. She was found to have a left upper and lower extremity drift as well as left upper and lower extremity weakness. NIHSS on arrival 3. CTH was negative for acute intracranial pathology. CTA head and neck CTP was negative for LVO. She was outside the therapeutic window for IV TPA. No acute stroke intervention. Her left sided facial twitching has resolved, drift of left upper and lower extremity is persistent. She is in the stroke unit awaiting for stroke workup.      Relevant PMH:  [] Prior ischemic stroke/TIA  [] Afib  []CAD  []HTN  []DLD  []DM []PVD []Obesity [] Sedintary lifestyle []CHF  []JULY[]Cancer Hx     Social History: [] Smoking []  Drug Use: []   Alcohol Use:   [] Other:      Possible Location of Stroke:  Unknown we will have a better understanding post stroke workup.  Possible Cause of Stroke:  Unknown we will have a better understanding post stroke workup.  Relevant Cerebral Imaging:  CTH negative for acute intracranial pathology.  Relevant Cervicocerebral Imaging:   CTA head and neck CTP negative for LVO    CT Perfusion w/ Maps w/ IV Cont:   EXAM:  CT PERFUSION W MAPS IC        IMPRESSION:    No evidence of major vascular stenosis or occlusion. Normal perfusion   images.    Relevant blood tests:  Direct LDL: 123    Relevant cardiac rhythm monitoring:  Telemetry monitoring for 24 hours and no events reported.  Relevant Cardiac Structure:(TTE/RAFAEL +/-):[]No intracardiac thrombus/[] no vegetation/[]no akynesia/EF:       TTE pending    Home Medications:      MEDICATIONS  (STANDING):  aspirin  chewable 162 milliGRAM(s) Oral daily  atorvastatin 80 milliGRAM(s) Oral at bedtime  enoxaparin Injectable 40 milliGRAM(s) SubCutaneous daily      PT/OT/Speech/Rehab/S&Swr: Pending    Exam:    Vital Signs Last 24 Hrs  T(C): 36.5 (10 Oct 2018 11:15), Max: 36.5 (10 Oct 2018 11:15)  T(F): 97.7 (10 Oct 2018 11:15), Max: 97.7 (10 Oct 2018 11:15)  HR: 84 (10 Oct 2018 11:15) (66 - 88)  BP: 117/67 (10 Oct 2018 11:15) (105/67 - 140/87)  BP(mean): --  RR: 20 (10 Oct 2018 11:15) (16 - 20)  SpO2: 98% (10 Oct 2018 11:15) (98% - 99%)    NIHSS      LOC:       1a:  0   1b(Questions):    0       1c(Instructions):    0         Best Gaze:0  Visual:0  Motor:                 RUE:  0   RLE:  0   LUE:  1   LLE: 1    FACE:   0  Limb Ataxia:0  Sensory:     1  Language:   0    Dysarthria:   0       Extinction and Inattention:0    NIHSS on admission:    3    NIHSS today: 3   m-RS:1    Impression:  43 year old right handed woman with a past medical history of panic attacks. She woke up with left upper and lower extremity numbness. Upon arrival to ED stroke code called. She was found to have a left upper and lower extremity drift as well as left upper and lower extremity weakness. NIHSS on arrival 3. CTH was negative for acute intracranial pathology. CTA head and neck CTP was negative for LVO. She was outside the therapeutic window for IV TPA. Her left facial twitching has resolved, left upper and lower extremity weakness persists.      Suggestion:  Routine stroke workup including:  MRI brain without garrick.  TTE with contrast to rule out right to left shunt, akinesia, vegetation, intracardiac thrombus.  Telemetry monitoring.  LDL/A1C  PT OT Rehab    Continue ASA/statin  Follow up EEG.  Seizure precautions.  Keep magnesium >2.  Neurochecks Q4 hours and vitals q4 hours  Disposition:  Stroke Unit.    Luis Reveles NP  [phone]

## 2018-10-10 NOTE — PROGRESS NOTE ADULT - SUBJECTIVE AND OBJECTIVE BOX
SUBJECTIVE:  Patient is a 43y old Female who presents with a chief complaint of Left sided facial, upper and lower extremity numbness and hemisensory deficit (10 Oct 2018 11:32)  Currently admitted to medicine with the primary diagnosis of CVA (cerebral vascular accident)         INTERVAL HISTORY;  Today is hospital day 2d. This morning she is resting comfortably in bed and reports no new issues or overnight events.     PAST MEDICAL & SURGICAL HISTORY  Miscarriage: age of 17  Panic attack  S/P appendectomy  S/P laparoscopic cholecystectomy  H/O:  section: Twice    SOCIAL HISTORY:  Negative for smoking/alcohol/drug use.     ALLERGIES:  No Known Allergies    MEDICATIONS:  STANDING MEDICATIONS  aspirin  chewable 162 milliGRAM(s) Oral daily  atorvastatin 80 milliGRAM(s) Oral at bedtime  enoxaparin Injectable 40 milliGRAM(s) SubCutaneous daily    PRN MEDICATIONS    Home Medications:      VITALS:   T(F): 97.7  HR: 84  BP: 117/67  RR: 20  SpO2: 98%    LABS:                        10.1   6.07  )-----------( 280      ( 10 Oct 2018 08:08 )             31.5     10-10    140  |  104  |  8<L>  ----------------------------<  88  3.9   |  22  |  0.6<L>    Ca    8.7      10 Oct 2018 08:08  Mg     1.9     10-10    TPro  6.4  /  Alb  3.9  /  TBili  0.4  /  DBili  x   /  AST  14  /  ALT  11  /  AlkPhos  95  10-10    PT/INR - ( 09 Oct 2018 12:17 )   PT: 12.30 sec;   INR: 1.14 ratio         PTT - ( 09 Oct 2018 12:17 )  PTT:36.4 sec      Sedimentation Rate, Erythrocyte: 26 mm/hr <H> (10-10-18 @ 08:08)  Creatine Kinase, Serum: 123 U/L (10-09-18 @ 12:17)  Troponin T, Serum: <0.01 ng/mL (10-09-18 @ 12:17)      CARDIAC MARKERS ( 09 Oct 2018 12:17 )  x     / <0.01 ng/mL / 123 U/L / x     / 1.0 ng/mL      RADIOLOGY:  < from: Xray Chest 1 View AP/PA (10.09.18 @ 13:36) >  Impression:    No radiographic evidence of acute cardiopulmonary disease.      < from: CT Perfusion w/ Maps w/ IV Cont (10.09.18 @ 12:55) >  IMPRESSION:  No evidence of major vascular stenosis or occlusion. Normal perfusion   images.      < from: CT Brain Stroke Protocol (10.09.18 @ 12:39) >  IMPRESSION:   No CT evidence of acute intracranial hemorrhage or large territory   infarct.          PHYSICAL EXAM:  GEN: No acute distress  LUNGS: Clear to auscultation bilaterally   HEART: S1/S2 present. RRR.   ABD: Soft, non-tender, non-distended. Bowel sounds present  EXT: NC/NC/NE/2+PP/MCKINLEY/Skin Intact.   NEURO: AAOX3

## 2018-10-10 NOTE — PROGRESS NOTE ADULT - ASSESSMENT
43 year old right handed female with a past medical history of Panic Attacks and Miscarriage at the age of 17 presenting with left sided facial, upper and lower extremity numbness after waking up.      # CVA  Left sided numbness and weakness associated with left temporal headache, left blurry vision and left mastoid tenderness/jaw claudication; Right sided cerebral ischemia   - s/p CTP with no evidence of focal perfusion deficit to suggest acute cerebral ischemia or large territory infarct.  -ESR, CRP - ordered  - ASA 162mg, Atorvastatin 80mg  - Lipid profile, HbA1c, TSH, Mg - ordered  - TTE with bubble study pending. MRI without garrick pending.  - EEG pending.  - Seizure precautions, Fall precautions, Aspiration precautions  - Physical therapy, Occupational therapy, Physical Medicine rehab assessment, Speech and swallow evaluation  - Neurochecks q2      #Microcytic Anemia  - Asymptomatic  - Iron studies - ordered  - Monitor CBC    DVT prophylaxis; Lovenox  NPO for now until Speech and swallow assessment  Activity; ambulate only with assistance

## 2018-10-11 ENCOUNTER — TRANSCRIPTION ENCOUNTER (OUTPATIENT)
Age: 43
End: 2018-10-11

## 2018-10-11 VITALS
RESPIRATION RATE: 18 BRPM | DIASTOLIC BLOOD PRESSURE: 68 MMHG | TEMPERATURE: 99 F | HEART RATE: 73 BPM | SYSTOLIC BLOOD PRESSURE: 110 MMHG

## 2018-10-11 LAB
ANION GAP SERPL CALC-SCNC: 14 MMOL/L — SIGNIFICANT CHANGE UP (ref 7–14)
BASOPHILS # BLD AUTO: 0.03 K/UL — SIGNIFICANT CHANGE UP (ref 0–0.2)
BASOPHILS NFR BLD AUTO: 0.4 % — SIGNIFICANT CHANGE UP (ref 0–1)
BLD GP AB SCN SERPL QL: SIGNIFICANT CHANGE UP
BUN SERPL-MCNC: 10 MG/DL — SIGNIFICANT CHANGE UP (ref 10–20)
CALCIUM SERPL-MCNC: 8.9 MG/DL — SIGNIFICANT CHANGE UP (ref 8.5–10.1)
CARDIOLIPIN AB SER-ACNC: NEGATIVE — SIGNIFICANT CHANGE UP
CHLORIDE SERPL-SCNC: 105 MMOL/L — SIGNIFICANT CHANGE UP (ref 98–110)
CO2 SERPL-SCNC: 21 MMOL/L — SIGNIFICANT CHANGE UP (ref 17–32)
CREAT SERPL-MCNC: 0.6 MG/DL — LOW (ref 0.7–1.5)
CRP SERPL-MCNC: 0.15 MG/DL — SIGNIFICANT CHANGE UP (ref 0–0.4)
DRVVT SCREEN TO CONFIRM RATIO: SIGNIFICANT CHANGE UP
EOSINOPHIL # BLD AUTO: 0.14 K/UL — SIGNIFICANT CHANGE UP (ref 0–0.7)
EOSINOPHIL NFR BLD AUTO: 1.7 % — SIGNIFICANT CHANGE UP (ref 0–8)
FERRITIN SERPL-MCNC: 8 NG/ML — LOW (ref 15–150)
GLUCOSE SERPL-MCNC: 99 MG/DL — SIGNIFICANT CHANGE UP (ref 70–99)
HCT VFR BLD CALC: 32.1 % — LOW (ref 37–47)
HGB BLD-MCNC: 10.3 G/DL — LOW (ref 12–16)
IMM GRANULOCYTES NFR BLD AUTO: 0.4 % — HIGH (ref 0.1–0.3)
LA NT DPL PPP QL: SIGNIFICANT CHANGE UP
LYMPHOCYTES # BLD AUTO: 2.82 K/UL — SIGNIFICANT CHANGE UP (ref 1.2–3.4)
LYMPHOCYTES # BLD AUTO: 34.9 % — SIGNIFICANT CHANGE UP (ref 20.5–51.1)
MAGNESIUM SERPL-MCNC: 1.8 MG/DL — SIGNIFICANT CHANGE UP (ref 1.8–2.4)
MCHC RBC-ENTMCNC: 23.4 PG — LOW (ref 27–31)
MCHC RBC-ENTMCNC: 32.1 G/DL — SIGNIFICANT CHANGE UP (ref 32–37)
MCV RBC AUTO: 73 FL — LOW (ref 81–99)
MONOCYTES # BLD AUTO: 0.54 K/UL — SIGNIFICANT CHANGE UP (ref 0.1–0.6)
MONOCYTES NFR BLD AUTO: 6.7 % — SIGNIFICANT CHANGE UP (ref 1.7–9.3)
NEUTROPHILS # BLD AUTO: 4.52 K/UL — SIGNIFICANT CHANGE UP (ref 1.4–6.5)
NEUTROPHILS NFR BLD AUTO: 55.9 % — SIGNIFICANT CHANGE UP (ref 42.2–75.2)
NRBC # BLD: 0 /100 WBCS — SIGNIFICANT CHANGE UP (ref 0–0)
PLATELET # BLD AUTO: 283 K/UL — SIGNIFICANT CHANGE UP (ref 130–400)
POTASSIUM SERPL-MCNC: 3.9 MMOL/L — SIGNIFICANT CHANGE UP (ref 3.5–5)
POTASSIUM SERPL-SCNC: 3.9 MMOL/L — SIGNIFICANT CHANGE UP (ref 3.5–5)
PROT C ACT/NOR PPP: 93 % — SIGNIFICANT CHANGE UP (ref 65–129)
RBC # BLD: 4.4 M/UL — SIGNIFICANT CHANGE UP (ref 4.2–5.4)
RBC # FLD: 15.6 % — HIGH (ref 11.5–14.5)
SODIUM SERPL-SCNC: 140 MMOL/L — SIGNIFICANT CHANGE UP (ref 135–146)
TSH SERPL-MCNC: 5.01 UIU/ML — HIGH (ref 0.27–4.2)
TYPE + AB SCN PNL BLD: SIGNIFICANT CHANGE UP
WBC # BLD: 8.08 K/UL — SIGNIFICANT CHANGE UP (ref 4.8–10.8)
WBC # FLD AUTO: 8.08 K/UL — SIGNIFICANT CHANGE UP (ref 4.8–10.8)

## 2018-10-11 RX ORDER — ACETAMINOPHEN 500 MG
650 TABLET ORAL ONCE
Qty: 0 | Refills: 0 | Status: DISCONTINUED | OUTPATIENT
Start: 2018-10-11 | End: 2018-10-11

## 2018-10-11 RX ORDER — DOCUSATE SODIUM 100 MG
100 CAPSULE ORAL DAILY
Qty: 0 | Refills: 0 | Status: DISCONTINUED | OUTPATIENT
Start: 2018-10-11 | End: 2018-10-11

## 2018-10-11 RX ORDER — TOPIRAMATE 25 MG
1 TABLET ORAL
Qty: 30 | Refills: 0 | OUTPATIENT
Start: 2018-10-11 | End: 2018-11-09

## 2018-10-11 RX ORDER — FERROUS SULFATE 325(65) MG
1 TABLET ORAL
Qty: 30 | Refills: 0 | OUTPATIENT
Start: 2018-10-11 | End: 2018-11-09

## 2018-10-11 RX ORDER — ASPIRIN/CALCIUM CARB/MAGNESIUM 324 MG
1 TABLET ORAL
Qty: 30 | Refills: 0 | OUTPATIENT
Start: 2018-10-11 | End: 2018-11-09

## 2018-10-11 RX ORDER — ACETAMINOPHEN 500 MG
650 TABLET ORAL ONCE
Qty: 0 | Refills: 0 | Status: COMPLETED | OUTPATIENT
Start: 2018-10-11 | End: 2018-10-11

## 2018-10-11 RX ADMIN — ENOXAPARIN SODIUM 40 MILLIGRAM(S): 100 INJECTION SUBCUTANEOUS at 13:05

## 2018-10-11 RX ADMIN — Medication 162 MILLIGRAM(S): at 13:04

## 2018-10-11 RX ADMIN — Medication 650 MILLIGRAM(S): at 14:19

## 2018-10-11 NOTE — DISCHARGE NOTE ADULT - CARE PROVIDERS DIRECT ADDRESSES
,DirectAddress_Unknown ,DirectAddress_Unknown,[email or web address]

## 2018-10-11 NOTE — SWALLOW BEDSIDE ASSESSMENT ADULT - SWALLOW EVAL: DIAGNOSIS
No s/s aspiration/penetration for thin liquids and regular consistency
No s/s aspiration/penetration for thin liquids, puree, regular consistency

## 2018-10-11 NOTE — PROGRESS NOTE ADULT - REASON FOR ADMISSION
Left sided facial, upper and lower extremity numbness and hemisensory deficit
Left sided facial, upper and lower extremity numbness and hemisensory deficit
Left sided weakness and hemisensory deficit.
Left sided facial, upper and lower extremity numbness and hemisensory deficit
Left sided facial, upper and lower extremity numbness and hemisensory deficit

## 2018-10-11 NOTE — DISCHARGE NOTE ADULT - PROVIDER TOKENS
FREE:[LAST:[Josias],FIRST:[Tiesha],PHONE:[(   )    -],FAX:[(   )    -]] FREE:[LAST:[Josias],FIRST:[Tiesha],PHONE:[(   )    -],FAX:[(   )    -]],TOKEN:'[id number]',TOKEN:'[id number]'

## 2018-10-11 NOTE — DISCHARGE NOTE ADULT - MEDICATION SUMMARY - MEDICATIONS TO TAKE
I will START or STAY ON the medications listed below when I get home from the hospital:    aspirin 81 mg oral tablet  -- 1 tab(s) by mouth once a day   -- Take with food or milk.    -- Indication: For Prophylaxis    Topamax 25 mg oral tablet  -- 1 tab(s) by mouth once a day   -- Do not chew, break, or crush.  Do not drink alcoholic beverages when taking this medication.  Do not take this drug if you are pregnant.  It is very important that you take or use this exactly as directed.  Do not skip doses or discontinue unless directed by your doctor.  May cause drowsiness or dizziness.  Medication should be taken with plenty of water.  Obtain medical advice before taking any non-prescription drugs as some may affect the action of this medication.  Swallow whole.  Do not crush.  This drug may impair the ability to drive or operate machinery.  Use care until you become familiar with its effects.    -- Indication: For Migraine prophylaxis

## 2018-10-11 NOTE — DISCHARGE NOTE ADULT - PATIENT PORTAL LINK FT
You can access the [facility] Patient Portal, offered by [facility], by registering with the following website: [email or web address]

## 2018-10-11 NOTE — DISCHARGE NOTE ADULT - PLAN OF CARE
symptom control, evaluation and prevention of disease progression You were diagnosed with complicated migraines. CT head was negative for acute pathology.   Please continue taking Aspirin 81 mg once a day, Topamax 25 mg Once day and ferrous sulphate 325 mg once a day.  Follow up with your primary care doctor as out patient.  return if symptoms worsen or recur You were diagnosed with complicated migraines. CT head was negative for acute pathology.   Please continue taking Aspirin 81 mg once a day, Topamax 25 mg Once a day.  Follow up with your primary care doctor as out patient.  return if symptoms worsen or recur

## 2018-10-11 NOTE — DISCHARGE NOTE ADULT - CARE PROVIDER_API CALL
Tiesha Ferrara  Phone: (   )    -  Fax: (   )    -    Shannon Salazar), Internal Medicine  [address]  [address]  [place] [address]  Phone: [phone]  Fax: [phone]    Kalpesh Castillo), EEGEpilepsy; Neurology  [address]  [address]  [place] [address]  Phone: [phone]  Fax: [phone]

## 2018-10-11 NOTE — PROGRESS NOTE ADULT - ASSESSMENT
43 year old right handed female with a past medical history of Panic Attacks and one Miscarriage at the age of 17 presenting with left sided facial, upper and lower extremity numbness after waking up.      # Left sided weakness and numbness likely 2/2 CVA vs complicated migraine vs Seizure  Left sided numbness and weakness associated with left temporal headache, left blurry vision and left mastoid tenderness/jaw claudication; Right sided cerebral ischemia   - s/p CTP with no evidence of focal perfusion deficit to suggest acute cerebral ischemia or large territory infarct.  - ESR 26 10/10, CRP 0.15 10/10  - ASA 162mg, Atorvastatin 80mg  - Lipid profile: Cholesterol 168, TG 95, HDL 41,  10/10  - HbA1c 5.1 10/10  - TSH 5.01  - Mg - 2.0 > 1.9 > 1.8  - TTE with bubble study pending. MRI without garrick pending.  - EEG - normal 10/10  - Seizure precautions, Fall precautions, Aspiration precautions  - Physical therapy - cleared from their stand point, Occupational therapy, Physical Medicine rehab assessment, Speech and swallow evaluation - normal  - Neurochecks q2      #Microcytic Anemia  - Asymptomatic  - Iron studies - total iron 70, ferritin 8,  10/10      DVT prophylaxis; Lovenox  Activity; ambulate only with assistance

## 2018-10-11 NOTE — SWALLOW BEDSIDE ASSESSMENT ADULT - SLP GENERAL OBSERVATIONS
Pt awake and alert with no c/o pain. S+L WFL
Pt awake and alert with no c/o pain, family at bedside. Speech clear, pt reports baseline speech and language

## 2018-10-11 NOTE — PROGRESS NOTE ADULT - SUBJECTIVE AND OBJECTIVE BOX
SUBJECTIVE:  Patient is a 43y old Female who presented with a chief complaint of Left sided facial, upper and lower extremity numbness and hemisensory deficit (10 Oct 2018 11:51)  Currently admitted to medicine with the primary diagnosis of CVA (cerebral vascular accident)       INTERVAL HISTORY;  Today is hospital day 3d. This morning she is resting comfortably in bed and reports no new issues or overnight events.     PAST MEDICAL & SURGICAL HISTORY  Miscarriage: age of 17  Panic attack  S/P appendectomy  S/P laparoscopic cholecystectomy  H/O:  section: Twice    SOCIAL HISTORY:  Negative for smoking/alcohol/drug use.     ALLERGIES:  No Known Allergies    MEDICATIONS:  STANDING MEDICATIONS  aspirin  chewable 162 milliGRAM(s) Oral daily  atorvastatin 80 milliGRAM(s) Oral at bedtime  enoxaparin Injectable 40 milliGRAM(s) SubCutaneous daily    PRN MEDICATIONS    Home Medications:      VITALS:   T(F): 97.6  HR: 65  BP: 108/66  RR: 16  SpO2: 98%    LABS:                        10.3   8.08  )-----------( 283      ( 11 Oct 2018 06:45 )             32.1     10-11    140  |  105  |  10  ----------------------------<  99  3.9   |  21  |  0.6<L>    Ca    8.9      11 Oct 2018 06:45  Mg     1.8     10-11    TPro  6.4  /  Alb  3.9  /  TBili  0.4  /  DBili  x   /  AST  14  /  ALT  11  /  AlkPhos  95  10-10    PT/INR - ( 09 Oct 2018 12:17 )   PT: 12.30 sec;   INR: 1.14 ratio         PTT - ( 09 Oct 2018 12:17 )  PTT:36.4 sec      CARDIAC MARKERS ( 09 Oct 2018 12:17 )  x     / <0.01 ng/mL / 123 U/L / x     / 1.0 ng/mL      RADIOLOGY:  < from: Xray Chest 1 View AP/PA (10.09.18 @ 13:36) >  Impression:    No radiographic evidence of acute cardiopulmonary disease.      < from: CT Perfusion w/ Maps w/ IV Cont (10.09.18 @ 12:55) >  IMPRESSION:  No evidence of major vascular stenosis or occlusion. Normal perfusion   images.      < from: CT Brain Stroke Protocol (10.09.18 @ 12:39) >  IMPRESSION:   No CT evidence of acute intracranial hemorrhage or large territory   infarct.      < from: VA Duplex Lower Ext Vein Scan, Bilat (10.10.18 @ 15:02) >  Impression:  No evidence of deep venous thrombosis or superficial thrombophlebitis in   bilateral lower extremities.          PHYSICAL EXAM:  GEN: No acute distress  LUNGS: Clear to auscultation bilaterally   HEART: S1/S2 present. RRR.   ABD: Soft, non-tender, non-distended. Bowel sounds present  EXT: NC/NC/NE/2+PP/MCKINLEY/Skin Intact.   NEURO: AAOX3, NIH score 3, Strength 4/5 LE UE, decreased sensation to touch pain left side starting below left eyelid, normal tone bilateral, drift LE UE, bilateral plantar response, reflexes 2+

## 2018-10-11 NOTE — DISCHARGE NOTE ADULT - HOSPITAL COURSE
43 year old right handed female with a past medical history of Panic Attacks and one Miscarriage at the age of 17 presenting with left sided facial, upper and lower extremity numbness after waking up.      # Left sided weakness and numbness likely 2/2 CVA vs complicated migraine vs Seizure  Left sided numbness and weakness associated with left temporal headache, left blurry vision and left mastoid tenderness/jaw claudication; Right sided cerebral ischemia   - s/p CTP with no evidence of focal perfusion deficit to suggest acute cerebral ischemia or large territory infarct.  - ESR 26 10/10, CRP 0.15 10/10  - ASA 162mg, Atorvastatin 80mg  - Lipid profile: Cholesterol 168, TG 95, HDL 41,  10/10  - HbA1c 5.1 10/10  - TSH 5.01  - Mg - 2.0 > 1.9 > 1.8  - TTE with bubble study pending. MRI without garrick pending.  - EEG - normal 10/10      #Microcytic Anemia  - Asymptomatic  - Iron studies - total iron 70, ferritin 8,  10/10 43 year old right handed female with a past medical history of Panic Attacks and one Miscarriage at the age of 17 presenting with left sided facial, upper and lower extremity numbness after waking up.      # Left sided weakness and numbness likely 2/2 complicated migraine  Left sided numbness and weakness associated with left temporal headache, left blurry vision and left mastoid tenderness/jaw claudication;  - s/p CTP with no evidence of focal perfusion deficit to suggest acute cerebral ischemia or large territory infarct.  - ESR 26 10/10, CRP 0.15 10/10  - Lipid profile: Cholesterol 168, TG 95, HDL 41,  10/10  - HbA1c 5.1 10/10  - TSH 5.01  - Mg - 2.0 > 1.9 > 1.8  MRI negative  - EEG - normal 10/10      #Microcytic Anemia  - Asymptomatic  - Iron studies - total iron 70, ferritin 8,  10/10

## 2018-10-11 NOTE — PROGRESS NOTE ADULT - SUBJECTIVE AND OBJECTIVE BOX
Neurology Follow up note    Name  JACKIE PEACOCK    HPI:  43 year old right handed female with a past medical history of Panic Attacks and Miscarriage at the age of 17 presenting with left sided facial, upper and lower extremity numbness after waking up. Patient reports that she has been having a headache for the past 3 days, was working when it started, constant, localized to the frontal area, described as a pressure/tension, relieved mildly by Excedrin, exacerbated by anxiety, not associated with any factors. Patient reports that today she woke up with left upper lip swelling and left sided facial numbness below the eye to the mandibular level, as well as left sided upper extremity and lower extremity numbness. Patient reports that she was experiencing a headache at the onset of symptoms when she woke up, similar to the one that she has been having for the past 3 days.  Patient reports blurry vision in both eyes, described as white drops in her eyes, not associated with pain.  Patient also reports imbalance when she walks, leaning more towards the left side, not associated with a fall. Patient also reports nausea that started this morning, not associated with vomiting, alleviated on its own. Patient endorses jaw clenching, and jaw claudication when she opens her mouth that is associated with left sided tenderness behind her ear. Patient denies lightheadedness, dizziness, slurry speech, urinary or fecal incontinence. Patient denies chest pain, palpitations, shortness of breath, abdominal pain, lower extremity swelling/warmth/tenderness.  No OCP use, no herbal medications, no recent travels, no history of strokes in the family at a young age (father at the age of 70). (09 Oct 2018 15:31)      Interval History - No new complaints.  Sensory loss slightly better overnight          Vital Signs Last 24 Hrs  T(C): 36.4 (11 Oct 2018 07:25), Max: 36.7 (10 Oct 2018 23:23)  T(F): 97.6 (11 Oct 2018 07:25), Max: 98 (10 Oct 2018 23:23)  HR: 65 (11 Oct 2018 07:25) (65 - 84)  BP: 108/66 (11 Oct 2018 07:25) (97/54 - 117/67)  BP(mean): --  RR: 16 (11 Oct 2018 07:25) (16 - 20)  SpO2: 98% (10 Oct 2018 11:15) (98% - 98%)  ICU Vital Signs Last 24 Hrs  T(C): 36.4 (11 Oct 2018 07:25), Max: 36.7 (10 Oct 2018 23:23)  T(F): 97.6 (11 Oct 2018 07:25), Max: 98 (10 Oct 2018 23:23)  HR: 65 (11 Oct 2018 07:25) (65 - 84)  BP: 108/66 (11 Oct 2018 07:25) (97/54 - 117/67)  BP(mean): --  ABP: --  ABP(mean): --  RR: 16 (11 Oct 2018 07:25) (16 - 20)  SpO2: 98% (10 Oct 2018 11:15) (98% - 98%)          Neurological Exam:   A+Ox3 language and attention normal  CN 2-12 normal except still some decrease sensation in left V1-V3  no drift power 5/5  decreased sensation in left extremities to temp, vib  DTR 2+ in UE and 1+ in LE  FTN NL    Medications  aspirin  chewable 162 milliGRAM(s) Oral daily  atorvastatin 80 milliGRAM(s) Oral at bedtime  enoxaparin Injectable 40 milliGRAM(s) SubCutaneous daily      Lab                        10.3   8.08  )-----------( 283      ( 11 Oct 2018 06:45 )             32.1     10-11    140  |  105  |  10  ----------------------------<  99  3.9   |  21  |  0.6<L>    Ca    8.9      11 Oct 2018 06:45  Mg     1.8     10-11    TPro  6.4  /  Alb  3.9  /  TBili  0.4  /  DBili  x   /  AST  14  /  ALT  11  /  AlkPhos  95  10-10    CAPILLARY BLOOD GLUCOSE        LIVER FUNCTIONS - ( 10 Oct 2018 08:08 )  Alb: 3.9 g/dL / Pro: 6.4 g/dL / ALK PHOS: 95 U/L / ALT: 11 U/L / AST: 14 U/L / GGT: x           PT/INR - ( 09 Oct 2018 12:17 )   PT: 12.30 sec;   INR: 1.14 ratio         PTT - ( 09 Oct 2018 12:17 )  PTT:36.4 sec    Radiology  MRI brain pending    < from: CT Perfusion w/ Maps w/ IV Cont (10.09.18 @ 12:55) >  EXAM:  CT PERFUSION W MAPS IC            PROCEDURE DATE:  10/09/2018            INTERPRETATION:  Clinical History / Reason for exam: Stroke code.    Technique: CT angiogram of the head and neck including perfusion study of   the brain.  Contiguous CT axial images of the head and neck were obtained   following the bolus intravenous administration of 120 cc Optiray with   multiple 3-D and MIP reformats with perfusion mapping performed on the   3-D workstation.    Correlation made with accompanying noncontrast head CT.    Findings:    Perfusion:  There is no evidence of focal perfusion deficit to suggest   acute cerebral ischemia.    CTA neck: The visualized aortic arch and great vessel origins are patent.    The innominate and subclavian arteries are patent.    The right common, internal and external carotid arteries are patent.    The left common, internal and external carotid arteries are patent.    The vertebral arteries are patent bilaterally.    CTA brain: The distal segments of the internal carotid arteries are   patent.  There is normal bifurcation into the A1 and M1 segments.  The   anterior and middle cerebral arteries are patent.    The distal vertebral arteries are patent. The posterior inferior   cerebellar arteries are patent. The basilar artery is patent with normal   bifurcation into the P1 segments.    Other:    Mild cervical spine degenerative changes.     IMPRESSION:    No evidence of major vascular stenosis or occlusion. Normal perfusion   images.    Spoke with BECKY MASON on 10/9/2018 1:12 PM with readback                  DIMITRIS BRAY M.D., ATTENDING RADIOLOGIST  This document has been electronically signed. Oct  9 2018  1:13PM    < end of copied text >      Assessment- Patient presenting with left sided focal symptoms.  DDX: seizure, complicated migraine, stroke, conversion    Plan  1. If MRI brain (-) then can be dc'd home with out patient 72 hour ambulatory EEG  2. Call if MRI brain (+) for new stroke  3. Continue ASA 81mg QD and if mri brain (-) decrease statin to 10mg QD  4. Call with changes or findings

## 2018-10-11 NOTE — PROGRESS NOTE ADULT - ATTENDING COMMENTS
Patient seen with me and the NP and note modified by me as appropriate.  Plan as above
Patient seen and examined agree with above except as noted.  Patient seen at stroke code with NP.  Patient was having left face grimacing during evaluation.  Has left hemisensory and drift on left side.  CTH and CTP negative    Plan as above which I have edited.
Patient seen and examined independently earlier today. Case discussed with housestaff, nursing, social work, patient. I agree with most of the resident's note, physical exam, and plan except as below. Patient feels better. No new complaints. On my exam Lt 5-/5. Pt reports frequent migraines (up to 3 times per wk as of late). MRI exam noted - s/o changes associated with migraines. Pt ambulating on her own and cleared by PT. D/w neuro who cleared pt for d/c on Topamax. Pt agrees with plan. Final Dx: complicated. migraine.

## 2018-10-11 NOTE — DISCHARGE NOTE ADULT - ADDITIONAL INSTRUCTIONS
Please Follow up with the Doctor as out patient on October 15th, 2018 at 12:30 pm on [address] [place] [address].   Their contact number si [phone] Please Follow up with your PMD or at [facility] on October 15th, 2018 at 12:30 pm on [address] [place] [address].   Their contact number si [phone]  Follow up with neurologist in 2wks

## 2018-10-12 LAB
APCR PPP: 2.78 RATIO — SIGNIFICANT CHANGE UP
B2 GLYCOPROT1 AB SER QL: NEGATIVE — SIGNIFICANT CHANGE UP

## 2018-10-14 LAB — PROT S FREE PPP-ACNC: 68 % NORMAL — SIGNIFICANT CHANGE UP (ref 60–140)

## 2018-10-15 PROBLEM — O03.9 COMPLETE OR UNSPECIFIED SPONTANEOUS ABORTION WITHOUT COMPLICATION: Chronic | Status: ACTIVE | Noted: 2018-10-09

## 2018-10-15 PROBLEM — Z00.00 ENCOUNTER FOR PREVENTIVE HEALTH EXAMINATION: Status: ACTIVE | Noted: 2018-10-15

## 2018-10-15 PROBLEM — F41.0 PANIC DISORDER [EPISODIC PAROXYSMAL ANXIETY]: Chronic | Status: ACTIVE | Noted: 2018-10-09

## 2018-10-18 DIAGNOSIS — Z79.82 LONG TERM (CURRENT) USE OF ASPIRIN: ICD-10-CM

## 2018-10-18 DIAGNOSIS — G81.94 HEMIPLEGIA, UNSPECIFIED AFFECTING LEFT NONDOMINANT SIDE: ICD-10-CM

## 2018-10-18 DIAGNOSIS — G43.109 MIGRAINE WITH AURA, NOT INTRACTABLE, WITHOUT STATUS MIGRAINOSUS: ICD-10-CM

## 2018-10-18 DIAGNOSIS — F41.0 PANIC DISORDER [EPISODIC PAROXYSMAL ANXIETY]: ICD-10-CM

## 2018-10-18 DIAGNOSIS — R20.0 ANESTHESIA OF SKIN: ICD-10-CM

## 2018-10-22 ENCOUNTER — APPOINTMENT (OUTPATIENT)
Dept: INTERNAL MEDICINE | Facility: CLINIC | Age: 43
End: 2018-10-22

## 2018-11-08 ENCOUNTER — TRANSCRIPTION ENCOUNTER (OUTPATIENT)
Age: 43
End: 2018-11-08

## 2018-12-17 ENCOUNTER — EMERGENCY (EMERGENCY)
Facility: HOSPITAL | Age: 43
LOS: 0 days | Discharge: HOME | End: 2018-12-17
Attending: EMERGENCY MEDICINE | Admitting: EMERGENCY MEDICINE

## 2018-12-17 VITALS
OXYGEN SATURATION: 98 % | RESPIRATION RATE: 18 BRPM | SYSTOLIC BLOOD PRESSURE: 119 MMHG | TEMPERATURE: 98 F | DIASTOLIC BLOOD PRESSURE: 63 MMHG | HEART RATE: 85 BPM

## 2018-12-17 DIAGNOSIS — Z98.891 HISTORY OF UTERINE SCAR FROM PREVIOUS SURGERY: Chronic | ICD-10-CM

## 2018-12-17 DIAGNOSIS — Z79.899 OTHER LONG TERM (CURRENT) DRUG THERAPY: ICD-10-CM

## 2018-12-17 DIAGNOSIS — Z98.890 OTHER SPECIFIED POSTPROCEDURAL STATES: ICD-10-CM

## 2018-12-17 DIAGNOSIS — Z90.49 ACQUIRED ABSENCE OF OTHER SPECIFIED PARTS OF DIGESTIVE TRACT: ICD-10-CM

## 2018-12-17 DIAGNOSIS — Z79.82 LONG TERM (CURRENT) USE OF ASPIRIN: ICD-10-CM

## 2018-12-17 DIAGNOSIS — R51 HEADACHE: ICD-10-CM

## 2018-12-17 DIAGNOSIS — Z90.49 ACQUIRED ABSENCE OF OTHER SPECIFIED PARTS OF DIGESTIVE TRACT: Chronic | ICD-10-CM

## 2018-12-17 DIAGNOSIS — G43.909 MIGRAINE, UNSPECIFIED, NOT INTRACTABLE, WITHOUT STATUS MIGRAINOSUS: ICD-10-CM

## 2018-12-17 LAB
ALBUMIN SERPL ELPH-MCNC: 4.4 G/DL — SIGNIFICANT CHANGE UP (ref 3.5–5.2)
ALP SERPL-CCNC: 114 U/L — SIGNIFICANT CHANGE UP (ref 30–115)
ALT FLD-CCNC: 16 U/L — SIGNIFICANT CHANGE UP (ref 0–41)
ANION GAP SERPL CALC-SCNC: 16 MMOL/L — HIGH (ref 7–14)
AST SERPL-CCNC: 23 U/L — SIGNIFICANT CHANGE UP (ref 0–41)
BASOPHILS # BLD AUTO: 0.03 K/UL — SIGNIFICANT CHANGE UP (ref 0–0.2)
BASOPHILS NFR BLD AUTO: 0.4 % — SIGNIFICANT CHANGE UP (ref 0–1)
BILIRUB SERPL-MCNC: 0.2 MG/DL — SIGNIFICANT CHANGE UP (ref 0.2–1.2)
BUN SERPL-MCNC: 10 MG/DL — SIGNIFICANT CHANGE UP (ref 10–20)
CALCIUM SERPL-MCNC: 9.1 MG/DL — SIGNIFICANT CHANGE UP (ref 8.5–10.1)
CHLORIDE SERPL-SCNC: 101 MMOL/L — SIGNIFICANT CHANGE UP (ref 98–110)
CO2 SERPL-SCNC: 23 MMOL/L — SIGNIFICANT CHANGE UP (ref 17–32)
CREAT SERPL-MCNC: 0.5 MG/DL — LOW (ref 0.7–1.5)
EOSINOPHIL # BLD AUTO: 0.03 K/UL — SIGNIFICANT CHANGE UP (ref 0–0.7)
EOSINOPHIL NFR BLD AUTO: 0.4 % — SIGNIFICANT CHANGE UP (ref 0–8)
GLUCOSE SERPL-MCNC: 94 MG/DL — SIGNIFICANT CHANGE UP (ref 70–99)
HCG SERPL QL: NEGATIVE — SIGNIFICANT CHANGE UP
HCT VFR BLD CALC: 34.5 % — LOW (ref 37–47)
HGB BLD-MCNC: 10.7 G/DL — LOW (ref 12–16)
IMM GRANULOCYTES NFR BLD AUTO: 0.4 % — HIGH (ref 0.1–0.3)
LYMPHOCYTES # BLD AUTO: 1.27 K/UL — SIGNIFICANT CHANGE UP (ref 1.2–3.4)
LYMPHOCYTES # BLD AUTO: 15.4 % — LOW (ref 20.5–51.1)
MCHC RBC-ENTMCNC: 22.4 PG — LOW (ref 27–31)
MCHC RBC-ENTMCNC: 31 G/DL — LOW (ref 32–37)
MCV RBC AUTO: 72.3 FL — LOW (ref 81–99)
MONOCYTES # BLD AUTO: 0.24 K/UL — SIGNIFICANT CHANGE UP (ref 0.1–0.6)
MONOCYTES NFR BLD AUTO: 2.9 % — SIGNIFICANT CHANGE UP (ref 1.7–9.3)
NEUTROPHILS # BLD AUTO: 6.65 K/UL — HIGH (ref 1.4–6.5)
NEUTROPHILS NFR BLD AUTO: 80.5 % — HIGH (ref 42.2–75.2)
PLATELET # BLD AUTO: 338 K/UL — SIGNIFICANT CHANGE UP (ref 130–400)
POTASSIUM SERPL-MCNC: 4.3 MMOL/L — SIGNIFICANT CHANGE UP (ref 3.5–5)
POTASSIUM SERPL-SCNC: 4.3 MMOL/L — SIGNIFICANT CHANGE UP (ref 3.5–5)
PROT SERPL-MCNC: 7.4 G/DL — SIGNIFICANT CHANGE UP (ref 6–8)
RBC # BLD: 4.77 M/UL — SIGNIFICANT CHANGE UP (ref 4.2–5.4)
RBC # FLD: 15.3 % — HIGH (ref 11.5–14.5)
SODIUM SERPL-SCNC: 140 MMOL/L — SIGNIFICANT CHANGE UP (ref 135–146)
WBC # BLD: 8.25 K/UL — SIGNIFICANT CHANGE UP (ref 4.8–10.8)
WBC # FLD AUTO: 8.25 K/UL — SIGNIFICANT CHANGE UP (ref 4.8–10.8)

## 2018-12-17 RX ORDER — SODIUM CHLORIDE 9 MG/ML
1000 INJECTION, SOLUTION INTRAVENOUS ONCE
Qty: 0 | Refills: 0 | Status: COMPLETED | OUTPATIENT
Start: 2018-12-17 | End: 2018-12-17

## 2018-12-17 RX ORDER — METOCLOPRAMIDE HCL 10 MG
10 TABLET ORAL ONCE
Qty: 0 | Refills: 0 | Status: COMPLETED | OUTPATIENT
Start: 2018-12-17 | End: 2018-12-17

## 2018-12-17 RX ADMIN — SODIUM CHLORIDE 1000 MILLILITER(S): 9 INJECTION, SOLUTION INTRAVENOUS at 11:46

## 2018-12-17 RX ADMIN — Medication 10 MILLIGRAM(S): at 11:46

## 2018-12-17 NOTE — ED PROVIDER NOTE - MEDICAL DECISION MAKING DETAILS
evaluated for recurrent headache simimlar to prior episodes in past with negative work up in past for SAH  or anuersymal bleeding. her neuro exam is nml, the descriptoin of her headache is inconsistent with SAH, she has a nml neuro exam and based on imaging recently there was no anuersym making SAH unlikley. no signs of meningiths. she felt better after fluids, reglan.

## 2018-12-17 NOTE — ED PROVIDER NOTE - PROGRESS NOTE DETAILS
Attending Note: I personally evaluated the patient. I reviewed the Resident’s or Physician Assistant’s note (as assigned above), and agree with the findings and plan except as documented in my note. 42 y/o F with no significant PMHx presents c/o of headache that started  last night as she was relaxing, felt headache in the middle of head, pt describes the pain to be constant, had 2 episodes of vomiting today. Pt describes headache to be more severe today than previous episodes, and the HA area is the same and the quality of pain is the same, last episode of HA pt did not vomit but felt nauseas. No fever, light does not bother pt. takes sumatriptan , nortriptyline which pt takes everyday for HA, Plan: Cat scan, Impression: migraine HA, cat scan, reglan and reassess. Exam: CONSTITUTIONAL: Well-developed; well-nourished; in no acute distress, nontoxic appearing. SKIN: skin exam is warm and dry. HEAD: Normocephalic; atraumatic. EYES: PERRL, 3 mm bilateral, no nystagmus, EOM intact; conjunctiva and sclera clear.ENT: MMM, no nasal congestion. NECK: Supple; non tender.  ROM intact. CARD: S1, S2 normal, no murmur. RESP: No wheezes, rales or rhonchi. Good air movement bilaterally. ABD: soft; non-distended; non-tender. No Rebound, No guarding. EXT: Normal ROM. No cyanosis or edema. Dp Pulses intact. NEURO: CN II-XII in-tact, normal strength and sensation, normal gait awake, alert, following commands, oriented, grossly unremarkable. No Focal deficits. GCS 15. PSYCH: Cooperative, appropriate. patient found sleeping comfortably. Results discussed with the patient. Headache has improved. States that she feels comfortable going home and understands to follow up with her neurologist. Return precautions discussed.

## 2018-12-17 NOTE — ED ADULT NURSE NOTE - NSIMPLEMENTINTERV_GEN_ALL_ED
Implemented All Universal Safety Interventions:  [place] to call system. Call bell, personal items and telephone within reach. Instruct patient to call for assistance. Room bathroom lighting operational. Non-slip footwear when patient is off stretcher. Physically safe environment: no spills, clutter or unnecessary equipment. Stretcher in lowest position, wheels locked, appropriate side rails in place.

## 2018-12-17 NOTE — ED PROVIDER NOTE - PHYSICAL EXAMINATION
CONSTITUTIONAL: Well-developed; well-nourished; in no acute distress.   SKIN: warm, dry  HEAD: Normocephalic; atraumatic.  EYES: PERRL, EOMI, no conjunctival erythema, no nystagmus.   ENT: No nasal discharge; airway clear.  NECK: Supple; non tender.  CARD: S1, S2 normal;  Regular rate and rhythm.   RESP: No wheezes, rales or rhonchi.  ABD: soft non tender, non distended, no rebound or guarding  EXT: Normal ROM.    LYMPH: No acute cervical adenopathy.  NEURO: Alert, oriented, grossly unremarkable. CN 2-11 intact, 5/5 strength in all 4 extremities, equal sensation bilaterally. walking normally unassisted.

## 2018-12-17 NOTE — ED PROVIDER NOTE - OBJECTIVE STATEMENT
44 yo F pmh of complicated migraines, presents with headache since last night. States her headache started at rest last night, was gradual in onset, pressure like in her frontal head, non radiating. No numbness, tingling or weakness. States that this feels similar to her last episode 2 months ago but the headache is worse. Last time had associated numbness and a stroke code was called, CTA was negative and was discharge with complicated migraine diagnosis. Able to walk normally. + N/V, no dizziness. Patient currently taking sumitriptan, and topimax. 42 yo F pmh of complicated migraines, presents with headache since last night. States her headache started at rest last night, was gradual in onset, pressure like in her frontal head, non radiating. No numbness, tingling or weakness. States that this feels similar to her last episode 2 months ago. Last time had associated numbness and a stroke code was called, CTA was negative and was discharge with complicated migraine diagnosis. Able to walk normally. + N/V, no dizziness. Patient currently taking sumitriptan, and topimax.

## 2018-12-17 NOTE — ED PROVIDER NOTE - NSFOLLOWUPINSTRUCTIONS_ED_ALL_ED_FT
Please follow up with your neurologist within 1-2 days.     Headache    A headache is pain or discomfort felt around the head or neck area. The specific cause of a headache may not be found as there are many types including tension headaches, migraine headaches, and cluster headaches. Watch your condition for any changes. Things you can do to manage your pain include taking over the counter and prescription medications as instructed by your health care provider, lying down in a dark quiet room, limiting stress, getting regular sleep, and refraining from alcohol and tobacco products.    SEEK IMMEDIATE MEDICAL CARE IF YOU HAVE ANY OF THE FOLLOWING SYMPTOMS: fever, vomiting, stiff neck, loss of vision, problems with speech, muscle weakness, loss of balance, trouble walking, passing out, or confusion.

## 2018-12-17 NOTE — ED PROVIDER NOTE - ATTENDING CONTRIBUTION TO CARE
Attending Note: I personally evaluated the patient. I reviewed the Resident’s or Physician Assistant’s note (as assigned above), and agree with the findings and plan except as documented in my note. 42 y/o F with no significant PMHx presents c/o of headache that started  last night as she was relaxing, felt headache in the middle of head, pt describes the pain to be constant, had 2 episodes of vomiting today. Patient has history of complicated migraines, this HA area is the same and the quality of pain is the same, last episode of HA pt did not vomit but felt nauseas. No fever, light does not bother pt. takes sumatriptan , nortriptyline which pt takes everyday for HA, she vomited her sumatriptan and nortriptyline today Plan: CT scan, Impression: migraine HA, cat scan, reglan and reassess. Exam: CONSTITUTIONAL: Well-developed; well-nourished; in no acute distress, nontoxic appearing. SKIN: skin exam is warm and dry. HEAD: Normocephalic; atraumatic. EYES: PERRL, 3 mm bilateral, no nystagmus, EOM intact; conjunctiva and sclera clear.ENT: MMM,  NECK: Supple; non tender.  ROM intact. CARD: S1, S2 normal, no murmur. RESP: No wheezes, rales or rhonchi. Good air movement bilaterally. ABD: soft; non-distended; non-tender. No Rebound, No guarding. EXT: Normal ROM. No cyanosis or edema. Dp Pulses intact. NEURO: CN II-XII in-tact, normal strength and sensation, normal gait awake, alert, following commands, oriented, grossly unremarkable. No Focal deficits. GCS 15. PSYCH: Cooperative, appropriate.

## 2018-12-17 NOTE — ED PROVIDER NOTE - NS ED ROS FT
Eyes:  No visual changes, eye pain or discharge.  ENMT: no sore throat or runny nose  Cardiac:  No chest pain, SOB   Respiratory:  No cough or respiratory distress.    GI:  +nausea and vomiting, no diarrhea or abdominal pain.  :  No dysuria, frequency or burning.  MS:  No joint pain or back pain.  Neuro:  + headache since last night, gradual in onset, 8/10, pressure like, frontal headache, non radiating. No numbness, tingling or weakness, no dizziness. walking normally. hx of complicated migraine diagnosis.   Skin:  No skin rash.   Endocrine: No history of thyroid disease or diabetes.

## 2019-04-10 NOTE — STROKE CODE NOTE - ER ARRIVAL: DATE/TIME
[facility] Emergency Department  [address]  [facility] [address]  Phone:  [phone]  Fax:  [phone]                                    Chey Krishnan   MRN: [id number]    Department:  [facility] Emergency Department   Date of Visit:  4/10/2019           After Visit Summary Signature Page    I have received my discharge instructions, and my questions have been answered. I have discussed any challenges I see with this plan with the nurse or doctor.    ..........................................................................................................................................  Patient/Patient Representative Signature      ..........................................................................................................................................  Patient Representative Print Name and Relationship to Patient    ..................................................               ................................................  Date                                   Time    ..........................................................................................................................................  Reviewed by Signature/Title    ...................................................              ..............................................  Date                                               Time          22EPIC Rev 08/18       
09-Oct-2018 11:59

## 2019-12-22 ENCOUNTER — TRANSCRIPTION ENCOUNTER (OUTPATIENT)
Age: 44
End: 2019-12-22

## 2020-02-24 NOTE — OCCUPATIONAL THERAPY INITIAL EVALUATION ADULT - STEREOGNOSIS, RUE, OT EVAL
Consent: The patient's consent was obtained including but not limited to risks of crusting, scabbing, blistering, scarring, darker or lighter pigmentary change, recurrence, incomplete removal and infection. Detail Level: Detailed Price (Use Numbers Only, No Special Characters Or $): 0 Render Post-Care Instructions In Note?: no Post-Care Instructions: I reviewed with the patient in detail post-care instructions. Patient is to wear sunprotection, and avoid picking at any of the treated lesions. Pt may apply Vaseline to crusted or scabbing areas. within normal limits

## 2023-10-26 NOTE — OCCUPATIONAL THERAPY INITIAL EVALUATION ADULT - GROOMING, PREVIOUS LEVEL OF FUNCTION, OT EVAL
Pulmonary Staff    I reviewed the images and report from her 10/12/2023 Chest CT scan - showing emphysema and multiple new pulmonary nodules since 2021 CT with largest 9 mm.  I briefly discussed CT findings and images with Dr. Graves on 10/20.  I called the patient on 10/20/23 but got voice mail with non-specific message left.    Options are:  1)  PET scan  2)  course of antibiotics and follow up Chest CT in 4-6 weeks  3)  move forward with diagnostic procedure directly    I called her number again today and got voice mail.  I said that I was calling re Chest CT scan result and asked her to contact our Pulmonary Nursing staff to insure communication.      I also sent an Epic message to Dr. Ortiz asking his advice about optimal next step.    Ishmael Bridges MD   independent

## 2024-01-01 NOTE — ED PROVIDER NOTE - NS ED MD DISPO DISCHARGE
Home For more information on Synagis risk factors, visit: https://publications.aap.org/redbook/book/347/chapter/4650714/Respiratory-Syncytial-Virus

## 2024-05-10 NOTE — ED PROVIDER NOTE - TOBACCO USE
Advance Care Planning:   Does patient have an Advance Directive:  currently not on file; education provided    [address] remains son: Robert Jennings.
Refill Routing Note   Medication(s) are not appropriate for processing by [facility] for the following reason(s):        Required labs outdated    [facility] action(s):  Defer               Appointments  past 12m or future 3m with PCP    Date Provider   Last Visit   2/20/2023 Vera Broussard MD   Next Visit   Visit date not found Vera Broussard MD   ED visits in past 90 days: 0        Note composed:10:19 AM 05/10/2024          
Never smoker

## 2025-01-28 NOTE — DISCHARGE NOTE ADULT - NS AS DC FOLLOWUP INST YN
January 28, 2025    To Whom It May Concern:         This is confirmation that Tian Huber attended her scheduled appointment with Paulina Shaw P.A.-C. on 1/28/25.  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  If possible, please allow her accommodations to switch to a less physically strenuous position until we have her underlying diagnosis and treatment plan.         If you have any questions please do not hesitate to call me at the phone number listed below.    Sincerely,          Paulina Shaw P.A.-C.  [phone]                 Yes